# Patient Record
Sex: FEMALE | Race: OTHER | HISPANIC OR LATINO | URBAN - METROPOLITAN AREA
[De-identification: names, ages, dates, MRNs, and addresses within clinical notes are randomized per-mention and may not be internally consistent; named-entity substitution may affect disease eponyms.]

---

## 2020-07-23 ENCOUNTER — EMERGENCY (EMERGENCY)
Facility: HOSPITAL | Age: 65
LOS: 1 days | Discharge: HOME | End: 2020-07-23
Attending: EMERGENCY MEDICINE | Admitting: EMERGENCY MEDICINE
Payer: MEDICAID

## 2020-07-23 VITALS
WEIGHT: 235.89 LBS | DIASTOLIC BLOOD PRESSURE: 85 MMHG | SYSTOLIC BLOOD PRESSURE: 168 MMHG | HEART RATE: 66 BPM | RESPIRATION RATE: 18 BRPM | OXYGEN SATURATION: 97 % | TEMPERATURE: 98 F

## 2020-07-23 PROCEDURE — 99283 EMERGENCY DEPT VISIT LOW MDM: CPT

## 2020-07-23 RX ORDER — GABAPENTIN 400 MG/1
300 CAPSULE ORAL ONCE
Refills: 0 | Status: COMPLETED | OUTPATIENT
Start: 2020-07-23 | End: 2020-07-23

## 2020-07-23 RX ADMIN — GABAPENTIN 300 MILLIGRAM(S): 400 CAPSULE ORAL at 17:26

## 2020-07-23 NOTE — ED PROVIDER NOTE - PATIENT PORTAL LINK FT
You can access the FollowMyHealth Patient Portal offered by [facility] by registering at the following website: [email or web address]. By joining [employer]’s FollowMyHealth portal, you will also be able to view your health information using other applications (apps) compatible with our system.

## 2020-07-23 NOTE — ED PROVIDER NOTE - PHYSICAL EXAMINATION
VITAL SIGNS: I have reviewed nursing notes and confirm.  CONSTITUTIONAL: Well-developed; well-nourished; in no acute distress.  SKIN: Skin exam is warm and dry, no acute rash.  HEAD: Normocephalic; atraumatic.  EYES: PERRL, EOM intact; conjunctiva and sclera clear.  ENT: No nasal discharge; airway clear.  NECK: Supple; non tender.  CARD: S1, S2 normal; no murmurs, gallops, or rubs. Regular rate and rhythm.  RESP: Unlabored. No wheezes, rales or rhonchi.  ABD: soft; non-distended; non-tender  EXT: Normal ROM. No cyanosis or edema. Non-ttp all ext, + some sensory deficit to light touch over head of fibula w/out skin changes, + some sensory deficit to light touch over anterolateral thigh w/out overlying skin changes, distal pulses intact  NEURO: Alert, oriented. Grossly unremarkable. LE exam as above, gait intact, 5/5 strength all ext  PSYCH: Cooperative, appropriate.

## 2020-07-23 NOTE — ED ADULT NURSE NOTE - NS ED NURSE LEVEL OF CONSCIOUSNESS ORIENTATION
Symptoms include: racing HR whch developed over the past 30 days, feels it in her toes. Episodes last from seconds to hours. Up to this point they have resolved. Symptoms include lightheaded, dizzy. Racing heart for no apparent reason.  No cardiac testing completed up to this point    Plan:  1. 48 hour Holter  2. Echo now  3. Possible EP study/possible Loop implant, use of [employer]  4. Check BP for the next days, bring results to next OV  5. Follow up two weeks after holter  
Oriented - self; Oriented - place; Oriented - time

## 2020-07-23 NOTE — ED PROVIDER NOTE - NSFOLLOWUPINSTRUCTIONS_ED_ALL_ED_FT
Log Out.    [place] CareNotes®     :  [facility]             PERIPHERAL NEUROPATHY - AfterCare(R) Instructions(ER/ED)     Peripheral Neuropathy    WHAT YOU NEED TO KNOW:    Peripheral neuropathy is a condition that affects how your nerves work. Nerves carry information from your brain to your body. The information does not transfer along your nerves correctly when you have neuropathy. When you have peripheral neuropathy, the nerves in your legs, arms, feet, or hands are affected. It also may affect your organs, such as your lungs, stomach, bladder, or genitals. This condition may go away on its own or you may always have it.    DISCHARGE INSTRUCTIONS:    Medicines:     Pain medicine: You may be given medicine to take away or decrease pain. Do not wait until the pain is severe before you take your medicine.      Antidepressants: This medicine helps to decrease or stop the symptoms of depression. It also used to help decrease pain. Take this medicine as directed.      Antiseizure medicine: This medicine is usually given to control seizures, but it also helps with nerve pain.       Take your medicine as directed. Contact your healthcare provider if you think your medicine is not helping or if you have side effects. Tell him of her if you are allergic to any medicine. Keep a list of the medicines, vitamins, and herbs you take. Include the amounts, and when and why you take them. Bring the list or the pill bottles to follow-up visits. Carry your medicine list with you in case of an emergency.    Follow up with your healthcare provider or neurologist as directed: Write down your questions so you remember to ask them during your visits.    Physical therapy: Physical and occupational therapists may help you exercise your arms, legs, and hands. They may teach you new ways to do things at home.    Brace or splint: You may need a device that supports or holds a body part still. For example, if you have carpal tunnel syndrome, you may need to wear a wrist brace.    Manage your peripheral neuropathy:     Avoid falls: Move with care and stand up slowly. Wear shoes that support your feet, and do not go barefoot. Ask about walking aids, such as a cane or walker. You may want to install railings or nonslip pads in your home, especially in the bathroom. Ask for more information on how to avoid falls.      Check your skin daily: Sores can form where your skin makes contact with chairs, beds, or other body parts. They also can form under splints. Keep your skin clean, and check your skin daily for sores.      Exercise: Ask about the best exercise plan for you. Physical activity may increase your balance and strength and may decrease your pain. It is best to start exercising slowly and do more as you get stronger.     Contact your healthcare provider or neurologist if:     Your pain is severe.       You cannot control your bladder.       You have trouble having sex.      You have questions or concerns about your condition or care.    Return to the emergency department if:     You fall.       You cannot walk at all.

## 2020-07-23 NOTE — ED PROVIDER NOTE - OBJECTIVE STATEMENT
64 y/o F w/hx HTN, HLD, OA, chronic back pain, fell 1mo ago, mechanical in nature, onto R knee and outstretched hands, no head injury or LOC. Was able to get up and walk immediately, pain improved over the course of the next few days, but pt has had persistent numbness/tingling/paresthesias over anterolateral upper shin. No knee swelling or pain with walking. Also endorsing some paresthesias over upper L thigh. States she's been exclusively sleeping on that side and favoring the L leg when walking but denies pain in the hip/groin/upper leg. Has persistent lower back pain that is unchanged from baseline and moderately improved with naprosyn. No urinary/fecal incontinence.

## 2020-07-23 NOTE — ED PROVIDER NOTE - CARE PROVIDER_API CALL
Angelito Giraldo A  NEUROSURGERY  [address] [place] [address], [place] [address]  Phone: [phone]  Fax: [phone]  Follow Up Time:

## 2020-07-23 NOTE — ED ADULT NURSE NOTE - NSIMPLEMENTINTERV_GEN_ALL_ED
Implemented All Universal Safety Interventions:  [place] to call system. Call bell, personal items and telephone within reach. Instruct patient to call for assistance. Room bathroom lighting operational. Non-slip footwear when patient is off stretcher. Physically safe environment: no spills, clutter or unnecessary equipment. Stretcher in lowest position, wheels locked, appropriate side rails in place.

## 2020-07-23 NOTE — ED ADULT TRIAGE NOTE - CHIEF COMPLAINT QUOTE
reports tingling to left hip radiating into pelvic region x 2 weeks- reports mechanical fall 1 month ago

## 2020-07-23 NOTE — ED PROVIDER NOTE - CLINICAL SUMMARY MEDICAL DECISION MAKING FREE TEXT BOX
cutaneous nerve injuries w/out kia tenderness or e/o infectious process, strenght intact, gait intact, ongoing for 1 mo. Will prescribe trial of gabapentin, adding tylenol to NSAID regimen, d/c home with ortho spine f/u. Given return precautions and anticipatory guidance.

## 2020-07-23 NOTE — ED PROVIDER NOTE - CARE PLAN
Principal Discharge DX:	Lateral femoral cutaneous neuropathy, left  Secondary Diagnosis:	Injury of right peroneal nerve, initial encounter

## 2020-07-24 RX ORDER — GABAPENTIN 400 MG/1
1 CAPSULE ORAL
Qty: 21 | Refills: 0
Start: 2020-07-24 | End: 2020-07-30
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
## 2020-07-27 DIAGNOSIS — G57.22 LESION OF FEMORAL NERVE, LEFT LOWER LIMB: ICD-10-CM

## 2020-07-27 DIAGNOSIS — R20.2 PARESTHESIA OF SKIN: ICD-10-CM

## 2020-07-27 DIAGNOSIS — Z91.040 LATEX ALLERGY STATUS: ICD-10-CM
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
## 2020-07-27 DIAGNOSIS — S84.11XA: ICD-10-CM
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
